# Patient Record
Sex: MALE | Race: WHITE | URBAN - NONMETROPOLITAN AREA
[De-identification: names, ages, dates, MRNs, and addresses within clinical notes are randomized per-mention and may not be internally consistent; named-entity substitution may affect disease eponyms.]

---

## 2022-05-26 ENCOUNTER — OFFICE VISIT (OUTPATIENT)
Dept: PULMONOLOGY | Age: 37
End: 2022-05-26
Payer: OTHER GOVERNMENT

## 2022-05-26 VITALS
DIASTOLIC BLOOD PRESSURE: 72 MMHG | SYSTOLIC BLOOD PRESSURE: 122 MMHG | TEMPERATURE: 96.5 F | HEART RATE: 56 BPM | HEIGHT: 70 IN | WEIGHT: 275.6 LBS | OXYGEN SATURATION: 96 % | BODY MASS INDEX: 39.46 KG/M2

## 2022-05-26 DIAGNOSIS — R05.3 CHRONIC COUGH: Primary | ICD-10-CM

## 2022-05-26 PROCEDURE — 99203 OFFICE O/P NEW LOW 30 MIN: CPT | Performed by: INTERNAL MEDICINE

## 2022-05-26 ASSESSMENT — ENCOUNTER SYMPTOMS
SHORTNESS OF BREATH: 0
STRIDOR: 0
ALLERGIC/IMMUNOLOGIC NEGATIVE: 1
WHEEZING: 0
EYES NEGATIVE: 1
CHEST TIGHTNESS: 0
SORE THROAT: 0
RESPIRATORY NEGATIVE: 1
CHOKING: 0
RHINORRHEA: 0
APNEA: 0
COUGH: 0
VOICE CHANGE: 0
TROUBLE SWALLOWING: 0

## 2022-05-26 NOTE — PROGRESS NOTES
Subjective:      Patient ID: Jihan Trevino is a 40 y.o. male. CC: Cough    HPI     [facility] JONNATHAN is referred by Dr Tamiko Burris from the [address] [place] [address]. The c/o is cough for 9 mos, at this time the cough has completely resolved, has not coughed for several days now, he is back to his normal self. No CXR or PFTs available at this time    Cough was manly non productive, diurnal would not waking up from sleep, no wheezing, sneezing, sputum production, hemoptysis, wt loss, fever. [facility] JONNATHAN is a never smoker, denies environmental allergies or bronchial asthma    Currently staying at home caring for her wife affected with a brain tumor,  by trade      Review of Systems   Constitutional: Negative for activity change, appetite change, chills, diaphoresis, fatigue, fever and unexpected weight change. HENT: Negative for postnasal drip, rhinorrhea, sneezing, sore throat, trouble swallowing and voice change. Eyes: Negative. Respiratory: Negative. Negative for apnea, cough, choking, chest tightness, shortness of breath, wheezing and stridor. Cardiovascular: Negative for chest pain. Allergic/Immunologic: Negative. All other systems reviewed and are negative    Lung Nodule Screening     [] Qualifies    [x] Does not qualify   [] Declined   [] Completed  No past medical history on file. No past surgical history on file. Social History     Tobacco Use    Smoking status: Not on file    Smokeless tobacco: Not on file   Substance Use Topics    Alcohol use: Not on file    Drug use: Not on file      Not on File   No family history on file. No current outpatient medications on file. No current facility-administered medications for this visit. LABS - none   There were no vitals taken for this visit.    Wt Readings from Last 3 Encounters:   No data found for Wt     Neck Circumference - 16.25 in; Mallampati Score - 1        Objective:   Physical Exam  Constitutional:       General: He is not in acute distress. Appearance: He is obese. HENT:      Nose: Nose normal.      Mouth/Throat:      Mouth: Mucous membranes are moist.      Pharynx: Oropharynx is clear. Eyes:      Pupils: Pupils are equal, round, and reactive to light. Cardiovascular:      Rate and Rhythm: Normal rate and regular rhythm. Pulses: Normal pulses. Heart sounds: Normal heart sounds. Pulmonary:      Effort: Pulmonary effort is normal.      Breath sounds: Normal breath sounds. Abdominal:      Palpations: Abdomen is soft. Musculoskeletal:         General: Normal range of motion. Cervical back: No rigidity. Lymphadenopathy:      Cervical: No cervical adenopathy. Neurological:      Mental Status: He is alert. Assessment:      Chronic cough for 9 mos currently resolved, back to her normal state of good health.   Patt Armendariz wishes to defer any w/u at this time due to the lack of symptoms and the need to care for his ill wife  Doubt life threatening illness due to duration of symptoms and lack of worisome associated features like, fever, wt loss, CP, hemoptysis etc.      Plan:       I asked Patt Armendariz to notify my office for change in condition  RTC prn